# Patient Record
Sex: FEMALE | HISPANIC OR LATINO | Employment: FULL TIME | URBAN - METROPOLITAN AREA
[De-identification: names, ages, dates, MRNs, and addresses within clinical notes are randomized per-mention and may not be internally consistent; named-entity substitution may affect disease eponyms.]

---

## 2022-06-06 ENCOUNTER — HOSPITAL ENCOUNTER (EMERGENCY)
Facility: CLINIC | Age: 22
Discharge: HOME | End: 2022-06-06
Attending: PHYSICIAN ASSISTANT | Admitting: PHYSICIAN ASSISTANT

## 2022-06-06 VITALS
OXYGEN SATURATION: 100 % | WEIGHT: 155 LBS | SYSTOLIC BLOOD PRESSURE: 125 MMHG | DIASTOLIC BLOOD PRESSURE: 76 MMHG | TEMPERATURE: 99.1 F | RESPIRATION RATE: 18 BRPM | HEART RATE: 96 BPM | HEIGHT: 65 IN | BODY MASS INDEX: 25.83 KG/M2

## 2022-06-06 DIAGNOSIS — S61.012A LACERATION OF LEFT THUMB WITHOUT DAMAGE TO NAIL, FOREIGN BODY PRESENCE UNSPECIFIED, INITIAL ENCOUNTER: ICD-10-CM

## 2022-06-06 PROCEDURE — 99283 EMERGENCY DEPT VISIT LOW MDM: CPT

## 2022-06-06 PROCEDURE — 12001 RPR S/N/AX/GEN/TRNK 2.5CM/<: CPT

## 2022-06-06 ASSESSMENT — ENCOUNTER SYMPTOMS
WOUND: 1
ARTHRALGIAS: 0

## 2022-06-06 NOTE — ED PROVIDER NOTES
"Food  History     Chief Complaint:  Laceration       HPI   Aramis Addison is a 21 year old female who is otherwise healthy, not up-to-date on her tetanus, presents emergency room today for evaluation of a finger laceration that happened while at work.  She is right-handed.  She was cutting bread.  No other injuries.    ROS:  Review of Systems   Musculoskeletal: Negative for arthralgias.   Skin: Positive for wound.      All other systems reviewed and are negative.    Allergies:  Allergies have not been reviewed     Medications:    No current outpatient medications on file.      Past Medical History:    No past medical history on file.  There is no problem list on file for this patient.       Past Surgical History:    No past surgical history on file.     Family History:    family history is not on file.    Social History:     PCP: No primary care provider on file.     Physical Exam     Patient Vitals for the past 24 hrs:   BP Temp Temp src Pulse Resp SpO2 Height Weight   06/06/22 1643 125/76 99.1  F (37.3  C) Temporal 96 18 100 % 1.651 m (5' 5\") 70.3 kg (155 lb)        Physical Exam  General: Well appearing, pleasant female, resting on exam bed  HEENT: No evidence of trauma.  Conjunctive are clear. Neck range of motion intact.  Nose and throat clear.  Respiratory: Good effort  Cardiovascular: Good distal perfusion  Gastrointestinal: Nondistended  Musculoskeletal: Atraumatic  Skin: Exposed skin clear.  Neurologic: Alert.  Psych:  Patient is cooperative, with normal affect.  Left first digit: pAtient has a laceration that is clean and longitudinal to the tip of her finger.  No evidence of foreign body or tendon involvement.  Range of motion intact with good strength.  Good sensation.  Good cap refill.    Emergency Department Course   ECG:  None    Imaging:  No orders to display        Laboratory:  Labs Ordered and Resulted from Time of ED Arrival to Time of ED Departure - No data to display "     Interventions:  Medications - No data to display       Laceration Repair      Procedure: Laceration Repair    Indication: Laceration    Consent: Verbal    Location: Left L first (thumb) finger    Length: 1 cm    Preparation: Irrigation with Shur-Clens.    Anesthesia/Sedation: Digital Block: A digital block was performed with Bupivacaine - 0.5% on the affected digit.      Treatment/Exploration: Wound explored, no foreign bodies found     Closure: The wound was closed with one layer. Skin/superficial layer was closed with 2 x 5-0 Nylon using Interrupted sutures.     Patient Status: The patient tolerated the procedure well: Yes. There were no complications.      Impression & Plan      Medical Decision Making:  Aramis Addison is a 21 year old female who is otherwise healthy, presents emergency room today for evaluation of a finger laceration.  See HPI.  Vitals are unremarkable.  She declined a tetanus shot today.  She understands risk. Left first digit: Patient has a laceration that is clean and longitudinal to the tip of her finger.  No evidence of foreign body or tendon involvement.  Range of motion intact with good strength.  Good sensation.  Good cap refill.  No indication for radiographs.  Her wound was anesthetized as above, cleaned, repaired.  Suture removal in 10 days.  Return with new or worsening symptoms.  No further questions and agrees with plan.  Work note provided.    Diagnosis:    ICD-10-CM    1. Laceration of left thumb without damage to nail, foreign body presence unspecified, initial encounter  S61.012A         Discharge Medications:  New Prescriptions    No medications on file        6/6/2022   Jake Goldman PA-C Cyr, Matthew R, PA-C  06/06/22 1821